# Patient Record
Sex: FEMALE
[De-identification: names, ages, dates, MRNs, and addresses within clinical notes are randomized per-mention and may not be internally consistent; named-entity substitution may affect disease eponyms.]

---

## 2019-01-07 ENCOUNTER — HOSPITAL ENCOUNTER (EMERGENCY)
Dept: HOSPITAL 92 - ERS | Age: 54
Discharge: HOME | End: 2019-01-07
Payer: SELF-PAY

## 2019-01-07 DIAGNOSIS — R00.2: Primary | ICD-10-CM

## 2019-01-07 DIAGNOSIS — E78.00: ICD-10-CM

## 2019-01-07 LAB
BICARBONATE (HCO3V): 25.9 MMOL/L (ref 22–29)
CA-I BLD-SCNC: 1.18 MMOL/L (ref 1.12–1.32)
CHLORIDE SERPL-SCNC: 109 MMOL/L (ref 98–113)
CO2 BLDV CALC-SCNC: 26.9 MMOL/L (ref 1–85)
CO2 TENSION (PVCO2): 34.7 MMHG (ref 41–51)
GLUCOSE SERPL-MCNC: 116 MG/DL (ref 70–105)
HCT VFR BLD CALC: 44 % (ref 36–52)
HEMOGLOBIN - CALC: 14.9 G/DL (ref 12–18)
O2 TENSION (PVO2): 47.4 MMHG (ref 35–45)
POTASSIUM SERPL-SCNC: 3.2 MMOL/L (ref 3.4–4.7)
SAO2 % BLDV FROM PO2: 86.2 % (ref 94–98)
SODIUM SERPL-SCNC: 142 MMOL/L (ref 138–145)

## 2019-01-07 PROCEDURE — 82565 ASSAY OF CREATININE: CPT

## 2019-01-07 PROCEDURE — 82330 ASSAY OF CALCIUM: CPT

## 2019-01-07 PROCEDURE — 82947 ASSAY GLUCOSE BLOOD QUANT: CPT

## 2019-01-07 PROCEDURE — 83605 ASSAY OF LACTIC ACID: CPT

## 2019-01-07 PROCEDURE — 71046 X-RAY EXAM CHEST 2 VIEWS: CPT

## 2019-01-07 PROCEDURE — 82435 ASSAY OF BLOOD CHLORIDE: CPT

## 2019-01-07 PROCEDURE — 84295 ASSAY OF SERUM SODIUM: CPT

## 2019-01-07 PROCEDURE — 82803 BLOOD GASES ANY COMBINATION: CPT

## 2019-01-07 PROCEDURE — 84132 ASSAY OF SERUM POTASSIUM: CPT

## 2019-01-07 PROCEDURE — 85014 HEMATOCRIT: CPT

## 2019-01-07 PROCEDURE — 93005 ELECTROCARDIOGRAM TRACING: CPT

## 2019-01-07 PROCEDURE — 71045 X-RAY EXAM CHEST 1 VIEW: CPT

## 2019-01-07 NOTE — RAD
TWO VIEWS CHEST:

 

Comparison: 1-7-19

 

History: Chest pain. 

 

FINDINGS:

Two views of the chest show normal sized cardiomediastinal silhouette. There is no evidence of consol
idation, mass, or pleural effusion. The bones are unremarkable.

 

IMPRESSION:

No evidence of acute cardiopulmonary disease.

 

POS: [facility]

## 2019-01-07 NOTE — RAD
SINGLE VIEW OF THE CHEST:

 

Comparison: 6-14-12

 

History: Chest pain. 

 

FINDINGS:

Single view of the chest shows a normal sized cardiomediastinal silhouette. There is no evidence of c
onsolidation, mass, or pleural effusion. The bones are unremarkable.

 

IMPRESSION:

No evidence of acute cardiopulmonary disease.

 

POS: [facility]

## 2022-08-25 ENCOUNTER — HOSPITAL ENCOUNTER (EMERGENCY)
Dept: HOSPITAL 92 - ERS | Age: 57
LOS: 1 days | Discharge: HOME | End: 2022-08-26
Payer: SELF-PAY
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
## 2022-08-25 DIAGNOSIS — M25.062: ICD-10-CM

## 2022-08-25 DIAGNOSIS — S83.92XA: Primary | ICD-10-CM

## 2022-09-21 ENCOUNTER — HOSPITAL ENCOUNTER (OUTPATIENT)
Dept: HOSPITAL 92 - MRI | Age: 57
Discharge: HOME | End: 2022-09-21
Attending: FAMILY MEDICINE
Payer: COMMERCIAL

## 2022-09-21 DIAGNOSIS — S83.242A: ICD-10-CM

## 2022-09-21 DIAGNOSIS — S82.832A: ICD-10-CM

## 2022-09-21 DIAGNOSIS — M65.862: ICD-10-CM

## 2022-09-21 DIAGNOSIS — M23.362: ICD-10-CM

## 2022-09-21 DIAGNOSIS — S83.422D: Primary | ICD-10-CM

## 2022-09-21 DIAGNOSIS — M71.22: ICD-10-CM

## 2022-09-21 DIAGNOSIS — S80.02XD: ICD-10-CM
